# Patient Record
Sex: MALE | Race: WHITE | NOT HISPANIC OR LATINO | Employment: FULL TIME | URBAN - METROPOLITAN AREA
[De-identification: names, ages, dates, MRNs, and addresses within clinical notes are randomized per-mention and may not be internally consistent; named-entity substitution may affect disease eponyms.]

---

## 2022-09-30 ENCOUNTER — CLINICAL SUPPORT (OUTPATIENT)
Dept: INTERNAL MEDICINE | Facility: CLINIC | Age: 57
End: 2022-09-30

## 2022-09-30 VITALS — DIASTOLIC BLOOD PRESSURE: 85 MMHG | SYSTOLIC BLOOD PRESSURE: 143 MMHG | HEART RATE: 66 BPM

## 2022-09-30 DIAGNOSIS — Z00.00 GENERAL MEDICAL EXAM: Primary | ICD-10-CM

## 2022-09-30 LAB
ALBUMIN SERPL-MCNC: 4.6 GM/DL (ref 3.5–5)
ALBUMIN/GLOB SERPL: 1.8 RATIO (ref 1.1–2)
ALP SERPL-CCNC: 72 UNIT/L (ref 40–150)
ALT SERPL-CCNC: 25 UNIT/L (ref 0–55)
AST SERPL-CCNC: 30 UNIT/L (ref 5–34)
BASOPHILS # BLD AUTO: 0.03 X10(3)/MCL (ref 0–0.2)
BASOPHILS NFR BLD AUTO: 0.3 %
BILIRUBIN DIRECT+TOT PNL SERPL-MCNC: 0.8 MG/DL
BUN SERPL-MCNC: 23.5 MG/DL (ref 8.4–25.7)
CALCIUM SERPL-MCNC: 9.5 MG/DL (ref 8.4–10.2)
CHLORIDE SERPL-SCNC: 103 MMOL/L (ref 98–107)
CO2 SERPL-SCNC: 25 MMOL/L (ref 22–29)
CREAT SERPL-MCNC: 1.34 MG/DL (ref 0.73–1.18)
EOSINOPHIL # BLD AUTO: 0.1 X10(3)/MCL (ref 0–0.9)
EOSINOPHIL NFR BLD AUTO: 1.1 %
ERYTHROCYTE [DISTWIDTH] IN BLOOD BY AUTOMATED COUNT: 13.2 % (ref 11.5–17)
EST. AVERAGE GLUCOSE BLD GHB EST-MCNC: 96.8 MG/DL
GFR SERPLBLD CREATININE-BSD FMLA CKD-EPI: >60 MLS/MIN/1.73/M2
GLOBULIN SER-MCNC: 2.6 GM/DL (ref 2.4–3.5)
GLUCOSE SERPL-MCNC: 86 MG/DL (ref 74–100)
GLUCOSE SERPL-MCNC: 89 MG/DL (ref 70–110)
HBA1C MFR BLD: 5 %
HCT VFR BLD AUTO: 39.9 % (ref 42–52)
HGB BLD-MCNC: 12.9 GM/DL (ref 14–18)
IMM GRANULOCYTES # BLD AUTO: 0.03 X10(3)/MCL (ref 0–0.04)
IMM GRANULOCYTES NFR BLD AUTO: 0.3 %
LYMPHOCYTES # BLD AUTO: 1.74 X10(3)/MCL (ref 0.6–4.6)
LYMPHOCYTES NFR BLD AUTO: 19.6 %
MCH RBC QN AUTO: 30 PG (ref 27–31)
MCHC RBC AUTO-ENTMCNC: 32.3 MG/DL (ref 33–36)
MCV RBC AUTO: 92.8 FL (ref 80–94)
MONOCYTES # BLD AUTO: 0.7 X10(3)/MCL (ref 0.1–1.3)
MONOCYTES NFR BLD AUTO: 7.9 %
NEUTROPHILS # BLD AUTO: 6.3 X10(3)/MCL (ref 2.1–9.2)
NEUTROPHILS NFR BLD AUTO: 70.8 %
NRBC BLD AUTO-RTO: 0 %
PLATELET # BLD AUTO: 295 X10(3)/MCL (ref 130–400)
PMV BLD AUTO: 11.3 FL (ref 7.4–10.4)
POC CHOLESTEROL, HDL: 46
POC CHOLESTEROL, LDL: 53
POC CHOLESTEROL, TOTAL: 113 MG/DL
POC GLUCOSE FASTING: NORMAL
POC TOTAL CHOLESTEROL / HDL RATIO: 2.4
POC TRIGLYCERIDES: 69
POTASSIUM SERPL-SCNC: 4.3 MMOL/L (ref 3.5–5.1)
PROT SERPL-MCNC: 7.2 GM/DL (ref 6.4–8.3)
PSA SERPL-MCNC: 3 NG/ML
RBC # BLD AUTO: 4.3 X10(6)/MCL (ref 4.7–6.1)
SODIUM SERPL-SCNC: 138 MMOL/L (ref 136–145)
WBC # SPEC AUTO: 8.9 X10(3)/MCL (ref 4.5–11.5)

## 2022-09-30 PROCEDURE — 99499 UNLISTED E&M SERVICE: CPT | Mod: ,,, | Performed by: FAMILY MEDICINE

## 2022-09-30 PROCEDURE — 82947 ASSAY GLUCOSE BLOOD QUANT: CPT | Mod: QW,,, | Performed by: FAMILY MEDICINE

## 2022-09-30 PROCEDURE — 99172 PR VISUAL FUNCT SCREENING, BILAT: ICD-10-PCS | Mod: ,,, | Performed by: FAMILY MEDICINE

## 2022-09-30 PROCEDURE — 82947 PR  ASSAY QUANTITATIVE,BLOOD GLUCOSE: ICD-10-PCS | Mod: QW,,, | Performed by: FAMILY MEDICINE

## 2022-09-30 PROCEDURE — 80061 LIPID PANEL: CPT | Mod: QW,,, | Performed by: FAMILY MEDICINE

## 2022-09-30 PROCEDURE — 85025 COMPLETE CBC W/AUTO DIFF WBC: CPT | Performed by: FAMILY MEDICINE

## 2022-09-30 PROCEDURE — 0358T PR BIOELECTRICAL IMPEDANCE WHOLE BODY COMPOSITION ASSESSMENT W/I&R: ICD-10-PCS | Mod: ,,, | Performed by: FAMILY MEDICINE

## 2022-09-30 PROCEDURE — 80061 PR  LIPID PANEL: ICD-10-PCS | Mod: QW,,, | Performed by: FAMILY MEDICINE

## 2022-09-30 PROCEDURE — 0358T BIA WHOLE BODY: CPT | Mod: ,,, | Performed by: FAMILY MEDICINE

## 2022-09-30 PROCEDURE — 84153 ASSAY OF PSA TOTAL: CPT | Performed by: FAMILY MEDICINE

## 2022-09-30 PROCEDURE — 99499 PR EXECUTIVE FIRE DEPARTMENT LGMD: ICD-10-PCS | Mod: ,,, | Performed by: FAMILY MEDICINE

## 2022-09-30 PROCEDURE — 36415 PR COLLECTION VENOUS BLOOD,VENIPUNCTURE: ICD-10-PCS | Mod: ,,, | Performed by: FAMILY MEDICINE

## 2022-09-30 PROCEDURE — 83036 HEMOGLOBIN GLYCOSYLATED A1C: CPT | Performed by: FAMILY MEDICINE

## 2022-09-30 PROCEDURE — 36415 COLL VENOUS BLD VENIPUNCTURE: CPT | Mod: ,,, | Performed by: FAMILY MEDICINE

## 2022-09-30 PROCEDURE — 99172 OCULAR FUNCTION SCREEN: CPT | Mod: ,,, | Performed by: FAMILY MEDICINE

## 2022-09-30 PROCEDURE — 80053 COMPREHEN METABOLIC PANEL: CPT | Performed by: FAMILY MEDICINE

## 2022-09-30 NOTE — PROGRESS NOTES
A one-time consultation was provided to this patient today. The following information was reviewed in detail with them:  -Health Risk Assessment (HRA)  -Vital Signs  -POC lipid panel  -InBody Assessment  -Diet, exercise, and general health recommendations    Recommendations were made to the patient, and they were encouraged to either follow up with their current PCP or establish with a PCP for follow up of any chronic problems.    Jacoby Blue is a 57 y.o. male.  Based on the patient's HRA, Lipid Panel, Vitals, and InBody Assessment, the following recommendations were made:  5lb fat mass loss while increasing skeletal muscle mass.   Monitor blood pressure. Goal is <140/90. See your doctor if remains elevated.        Cancer Screening: All recommended cancer screening exams are up to date which include:  colorectal cancer screening and prostate cancer screening         Exercise Prescription         Frequency: 3-5 sessions/week         Intensity: Moderate (sweating after 10 min, can carry a conversation but short winded)         Type: Aerobic: walking, stationary cycling, aquatic exercise, running          Time: 30 minutes (150 minutes/week)           Plus         Frequency: 2-3 session/week         Type: Strength/Resistance Training: exercises using resistance bands, weight machines, handheld  weights or body weight            Plus         Frequency: 5-7 sessions/week         Type: Flexibility training: stretching, yoga, paola chi         Time: 5-10 minutes    The following results were pending at the conclusion of the visit and will be followed up. The patient will be contacted with results:  Hgb A1c  PSA  CBC  CMP      Patient instructed to contact our office with any further questions or concerns  Debbie Mendoza MD

## 2022-10-03 NOTE — PROGRESS NOTES
Please let the patient know that all labs have been reviewed.     HbA1c, l and PSA are normal.   Creatinine level is mildly elevated but kidney function is normal. Stay hydrated, and monitor blood pressure as discussed during our visit.   Blood counts are a little low. Recommend f/u with your PCP for further evaluation and treatment.     POCT lipid panel discussed at time of visit.   Repeat screening in 1 year.

## 2023-02-08 ENCOUNTER — TELEPHONE (OUTPATIENT)
Dept: HEPATOLOGY | Facility: CLINIC | Age: 58
End: 2023-02-08
Payer: COMMERCIAL

## 2023-02-08 NOTE — TELEPHONE ENCOUNTER
----- Message from Sima Gray sent at 2023 12:48 PM CST -----  Regardin  Contact: Gloria Farrell  ( nurse )  from [facility] from is calling to speak to staff  in regards for one time visit and  evaluation for exam of possible prostate cancer for pt.  Prepayment will be in hand before appt.  Please Advise.   Request a call back                [phone] ( [phone])

## 2023-02-08 NOTE — TELEPHONE ENCOUNTER
Advised caller Dr. Tam is hepatology and not oncology. Caller is looking for a physician to review a pt's case and do an exam. DIONNA ENG

## 2023-03-23 DIAGNOSIS — Z00.00 GENERAL MEDICAL EXAM: Primary | ICD-10-CM

## 2023-04-03 ENCOUNTER — CLINICAL SUPPORT (OUTPATIENT)
Dept: INTERNAL MEDICINE | Facility: CLINIC | Age: 58
End: 2023-04-03
Payer: COMMERCIAL

## 2023-04-03 VITALS
BODY MASS INDEX: 26.89 KG/M2 | WEIGHT: 171.31 LBS | SYSTOLIC BLOOD PRESSURE: 143 MMHG | HEART RATE: 76 BPM | DIASTOLIC BLOOD PRESSURE: 93 MMHG | HEIGHT: 67 IN

## 2023-04-03 DIAGNOSIS — Z00.00 GENERAL MEDICAL EXAM: ICD-10-CM

## 2023-04-03 LAB
ALBUMIN SERPL-MCNC: 4.2 G/DL (ref 3.5–5)
ALBUMIN/GLOB SERPL: 1.3 RATIO (ref 1.1–2)
ALP SERPL-CCNC: 80 UNIT/L (ref 40–150)
ALT SERPL-CCNC: 185 UNIT/L (ref 0–55)
AST SERPL-CCNC: 80 UNIT/L (ref 5–34)
BASOPHILS # BLD AUTO: 0.01 X10(3)/MCL (ref 0–0.2)
BASOPHILS NFR BLD AUTO: 0.2 %
BILIRUBIN DIRECT+TOT PNL SERPL-MCNC: 0.5 MG/DL
BUN SERPL-MCNC: 19.6 MG/DL (ref 8.4–25.7)
CALCIUM SERPL-MCNC: 9.7 MG/DL (ref 8.4–10.2)
CHLORIDE SERPL-SCNC: 104 MMOL/L (ref 98–107)
CO2 SERPL-SCNC: 26 MMOL/L (ref 22–29)
CREAT SERPL-MCNC: 1.17 MG/DL (ref 0.73–1.18)
EOSINOPHIL # BLD AUTO: 0.09 X10(3)/MCL (ref 0–0.9)
EOSINOPHIL NFR BLD AUTO: 2.1 %
ERYTHROCYTE [DISTWIDTH] IN BLOOD BY AUTOMATED COUNT: 13.9 % (ref 11.5–17)
EST. AVERAGE GLUCOSE BLD GHB EST-MCNC: 111.2 MG/DL
GFR SERPLBLD CREATININE-BSD FMLA CKD-EPI: >60 MLS/MIN/1.73/M2
GLOBULIN SER-MCNC: 3.2 GM/DL (ref 2.4–3.5)
GLUCOSE SERPL-MCNC: 110 MG/DL (ref 74–100)
GLUCOSE SERPL-MCNC: NORMAL MG/DL (ref 70–110)
HBA1C MFR BLD: 5.5 %
HCT VFR BLD AUTO: 39.8 % (ref 42–52)
HGB BLD-MCNC: 12.8 G/DL (ref 14–18)
IMM GRANULOCYTES # BLD AUTO: 0.01 X10(3)/MCL (ref 0–0.04)
IMM GRANULOCYTES NFR BLD AUTO: 0.2 %
LYMPHOCYTES # BLD AUTO: 0.37 X10(3)/MCL (ref 0.6–4.6)
LYMPHOCYTES NFR BLD AUTO: 8.5 %
MCH RBC QN AUTO: 30 PG (ref 27–31)
MCHC RBC AUTO-ENTMCNC: 32.2 G/DL (ref 33–36)
MCV RBC AUTO: 93.4 FL (ref 80–94)
MONOCYTES # BLD AUTO: 0.53 X10(3)/MCL (ref 0.1–1.3)
MONOCYTES NFR BLD AUTO: 12.2 %
NEUTROPHILS # BLD AUTO: 3.34 X10(3)/MCL (ref 2.1–9.2)
NEUTROPHILS NFR BLD AUTO: 76.8 %
NRBC BLD AUTO-RTO: 0 %
PLATELET # BLD AUTO: 257 X10(3)/MCL (ref 130–400)
PMV BLD AUTO: 11 FL (ref 7.4–10.4)
POC CHOLESTEROL, HDL: 58
POC CHOLESTEROL, LDL: 77
POC CHOLESTEROL, TOTAL: 149 MG/DL
POC GLUCOSE FASTING: 109
POC TOTAL CHOLESTEROL / HDL RATIO: NORMAL
POC TRIGLYCERIDES: 68
POTASSIUM SERPL-SCNC: 4.4 MMOL/L (ref 3.5–5.1)
PROT SERPL-MCNC: 7.4 GM/DL (ref 6.4–8.3)
PSA SERPL-MCNC: 0.94 NG/ML
RBC # BLD AUTO: 4.26 X10(6)/MCL (ref 4.7–6.1)
SODIUM SERPL-SCNC: 138 MMOL/L (ref 136–145)
WBC # SPEC AUTO: 4.4 X10(3)/MCL (ref 4.5–11.5)

## 2023-04-03 PROCEDURE — 0358T BIA WHOLE BODY: CPT | Mod: ,,, | Performed by: FAMILY MEDICINE

## 2023-04-03 PROCEDURE — 84153 ASSAY OF PSA TOTAL: CPT

## 2023-04-03 PROCEDURE — 83036 HEMOGLOBIN GLYCOSYLATED A1C: CPT

## 2023-04-03 PROCEDURE — 82947 POCT LIPID PROFILE WITH GLUCOSE FINGERSTICK: ICD-10-PCS | Mod: QW,,, | Performed by: FAMILY MEDICINE

## 2023-04-03 PROCEDURE — 99172 OCULAR FUNCTION SCREEN: CPT | Mod: ,,, | Performed by: FAMILY MEDICINE

## 2023-04-03 PROCEDURE — 99172 PR VISUAL FUNCT SCREENING, BILAT: ICD-10-PCS | Mod: ,,, | Performed by: FAMILY MEDICINE

## 2023-04-03 PROCEDURE — 0358T PR BIOELECTRICAL IMPEDANCE WHOLE BODY COMPOSITION ASSESSMENT W/I&R: ICD-10-PCS | Mod: ,,, | Performed by: FAMILY MEDICINE

## 2023-04-03 PROCEDURE — 80053 COMPREHEN METABOLIC PANEL: CPT

## 2023-04-03 PROCEDURE — 36415 COLL VENOUS BLD VENIPUNCTURE: CPT | Mod: ,,, | Performed by: FAMILY MEDICINE

## 2023-04-03 PROCEDURE — 36415 PR COLLECTION VENOUS BLOOD,VENIPUNCTURE: ICD-10-PCS | Mod: ,,, | Performed by: FAMILY MEDICINE

## 2023-04-03 PROCEDURE — 99499 PR EXECUTIVE FIRE DEPARTMENT LGMD: ICD-10-PCS | Mod: ,,, | Performed by: FAMILY MEDICINE

## 2023-04-03 PROCEDURE — 80061 LIPID PANEL: CPT | Mod: QW,,, | Performed by: FAMILY MEDICINE

## 2023-04-03 PROCEDURE — 85025 COMPLETE CBC W/AUTO DIFF WBC: CPT

## 2023-04-03 PROCEDURE — 80061 POCT LIPID PROFILE WITH GLUCOSE FINGERSTICK: ICD-10-PCS | Mod: QW,,, | Performed by: FAMILY MEDICINE

## 2023-04-03 PROCEDURE — 82947 ASSAY GLUCOSE BLOOD QUANT: CPT | Mod: QW,,, | Performed by: FAMILY MEDICINE

## 2023-04-03 PROCEDURE — 99499 UNLISTED E&M SERVICE: CPT | Mod: ,,, | Performed by: FAMILY MEDICINE

## 2023-04-03 NOTE — PROGRESS NOTES
"A one-time consultation was provided to this patient today. The following information was reviewed in detail with them:  -Health Risk Assessment (HRA)  -Vital Signs  -POC lipid panel  -InBody Assessment  -Diet, exercise, and general health recommendations    Recommendations were made to the patient, and they were encouraged to either follow up with their current PCP or establish with a PCP for follow up of any chronic problems.    Jacoby Blue is a 57 y.o. male.  Since our visit last year, Mr. Blue was diagnosed (Oct 2022)and has been treated for prostate cancer by Dr. Berman. He is currently undergoing radiation and is half way through this treatment course. He is also taking testosterone blockers. He reports that he feels fatigued and has noticed decreased muscle mass. He is experiencing hot flashes and mood swings. He feels sad at times but not depressed.   His home blood pressure readings are 120s/70s. He feels fine today.     Based on the patient's HRA, Lipid Panel, Vitals, and InBody Assessment, the following recommendations were made:  Continue your current treatment plan and care with your physicians.   Follow a heart healthy diet and exercise regularly.     His PSA is expected to be elevated due to his current radiation treatment. He is aware of this and expecting a high value.         Cancer Screening: Not all recommended cancer screening exams are up to date. These include: colorectal cancer screening       Immunizations:  Not all recommended immunizations are up to date. These include: Shingles vaccine    BP (!) 143/93   Pulse 76   Ht 5' 7" (1.702 m)   Wt 77.7 kg (171 lb 4.8 oz)   BMI 26.83 kg/m²      Lab Results   Component Value Date    CHOL 149 04/03/2023    POCLDL 77 04/03/2023    POCHDL 58 04/03/2023    POCTCHDL 2.4 09/30/2022    POCTRIG 68 04/03/2023    GLUFAST 109 04/03/2023    POCGLU 89 09/30/2022         Exercise Prescription         Frequency: 3-5 sessions/week         Intensity: Moderate " (sweating after 10 min, can carry a conversation but short winded)         Type: Aerobic: walking, stationary cycling, aquatic exercise, running          Time: 30 minutes (150 minutes/week)           Plus         Frequency: 2-3 session/week         Type: Strength/Resistance Training: exercises using resistance bands, weight machines, handheld  weights or body weight            Plus         Frequency: 5-7 sessions/week         Type: Flexibility training: stretching, yoga, paola chi         Time: 5-10 minutes    Diet recommendations: heart healthy diet such as the Mediterranean-style diet.         -Eat an overall healthy dietary pattern that emphasizes:   -a wide variety of fruits and vegetables   -whole grains and products made up mostly of whole grains   -healthy sources of protein (mostly plants such as legumes and nuts; fish and seafood; low fat or nonfat dairy; and , if you eat meat and poultry, ensuring it is lean and unprocessed)   -liquid non-tropical vegetable oils   -minimally processed foods   -minimized intake of added sugars   -food prepared with little or no salt   -limited or preferably no alcohol intake    The following results were pending at the conclusion of the visit and will be followed up. The patient will be contacted with results:  Hgb A1c  PSA  CBC  CMP      Patient instructed to contact our office with any further questions or concerns  Debbie Mendoza MD

## 2023-04-04 NOTE — PROGRESS NOTES
Please let the patient know that all labs have been reviewed.  His sugar test is elevated but his diabetes screening test is normal at this time. However, it has increased in the last 6 months. He should monitor his sugar intake, especially his late afternoon/evening sugar intake and cut back by at least half.     His liver enzymes are elevated (Alanine Amnninotransferase and Aspartate Aminotransferase). It is recommended that he communicate this to his Radiation oncology team soon. I'm unsure if this is a recent increase or not. His oncology team would be able to tell him how worrisome this is. But he should follow up with them soon.     His blood counts (Hgb and Hct) are low but this is mostly unchanged from 6 months ago. His WBCs are low and could be due to his recent cancer treatment. He should also report this to his oncology team to ensure they are ok with this level.     His PSA is not elevated as expected and I will defer to his oncology team on the significance of this in light of his current radiation treatment.       CBC, HbA1c, chemistry panel and PSA reviewed.   POCT lipid panel discussed at time of visit.   Repeat screening in 1 year.

## 2023-04-05 ENCOUNTER — TELEPHONE (OUTPATIENT)
Dept: INTERNAL MEDICINE | Facility: CLINIC | Age: 58
End: 2023-04-05
Payer: COMMERCIAL

## 2023-04-05 NOTE — TELEPHONE ENCOUNTER
Below discussed with patient.  Verbalizes understanding.  No further questions voiced. Request for Lab results received from Dr Erica Vasquez.  Results faxed.  Patient notified.     Mitra Ramsay RN    ----- Message from Debbie Mendoza MD sent at 4/4/2023  7:42 AM CDT -----  Please let the patient know that all labs have been reviewed.  His sugar test is elevated but his diabetes screening test is normal at this time. However, it has increased in the last 6 months. He should monitor his sugar intake, especially his late afternoon/evening sugar intake and cut back by at least half.     His liver enzymes are elevated (Alanine Amnninotransferase and Aspartate Aminotransferase). It is recommended that he communicate this to his Radiation oncology team soon. I'm unsure if this is a recent increase or not. His oncology team would be able to tell him how worrisome this is. But he should follow up with them soon.     His blood counts (Hgb and Hct) are low but this is mostly unchanged from 6 months ago. His WBCs are low and could be due to his recent cancer treatment. He should also report this to his oncology team to ensure they are ok with this level.     His PSA is not elevated as expected and I will defer to his oncology team on the significance of this in light of his current radiation treatment.       CBC, HbA1c, chemistry panel and PSA reviewed.   POCT lipid panel discussed at time of visit.   Repeat screening in 1 year.

## 2023-04-14 ENCOUNTER — TELEPHONE (OUTPATIENT)
Dept: HEMATOLOGY/ONCOLOGY | Facility: CLINIC | Age: 58
End: 2023-04-14
Payer: COMMERCIAL

## 2023-04-14 NOTE — TELEPHONE ENCOUNTER
Returned call to Gloria regarding request for workers compensation second opinion. Original fax saved to media. Oncology nurse navigator escalated to management and awaiting response. Informed Gloria that she will receive a response as soon as available. Provided nurse navigator direct contact information.

## 2023-04-21 ENCOUNTER — TELEPHONE (OUTPATIENT)
Dept: HEMATOLOGY/ONCOLOGY | Facility: CLINIC | Age: 58
End: 2023-04-21
Payer: COMMERCIAL

## 2023-04-21 ENCOUNTER — PATIENT MESSAGE (OUTPATIENT)
Dept: HEMATOLOGY/ONCOLOGY | Facility: CLINIC | Age: 58
End: 2023-04-21
Payer: COMMERCIAL

## 2023-04-21 NOTE — TELEPHONE ENCOUNTER
Oncology nurse navigator contacted the patient to discuss the consult from [facility]. Patient requires a second opinion for prostate cancer. Nurse navigator was initially contacted by Medical Case manager Gloria Farrell with inquiry for consultation. Appropriate medical records will be provided now that appointment is scheduled. Patient agreed to visit at 11am on 5/23. Nurse contact information sent via [facility] portal. Patient verbalized understanding of appointment details. Instructed to call for any additional questions/concerns. Nurse navigator will continue to communicate with  to ensure completion of consultation.

## 2023-04-21 NOTE — TELEPHONE ENCOUNTER
Patient requests 2pm appointment on 5/23 rather than 11am. Appointment rescheduled as requested.

## 2023-05-16 ENCOUNTER — DOCUMENTATION ONLY (OUTPATIENT)
Dept: HEMATOLOGY/ONCOLOGY | Facility: CLINIC | Age: 58
End: 2023-05-16
Payer: COMMERCIAL

## 2023-05-16 DIAGNOSIS — C61 PROSTATE CA: Primary | ICD-10-CM

## 2023-05-23 ENCOUNTER — OFFICE VISIT (OUTPATIENT)
Dept: HEMATOLOGY/ONCOLOGY | Facility: CLINIC | Age: 58
End: 2023-05-23

## 2023-05-23 VITALS
BODY MASS INDEX: 27.89 KG/M2 | WEIGHT: 177.69 LBS | TEMPERATURE: 97 F | HEIGHT: 67 IN | HEART RATE: 83 BPM | RESPIRATION RATE: 17 BRPM | DIASTOLIC BLOOD PRESSURE: 81 MMHG | OXYGEN SATURATION: 99 % | SYSTOLIC BLOOD PRESSURE: 138 MMHG

## 2023-05-23 DIAGNOSIS — C61 PROSTATE CANCER: ICD-10-CM

## 2023-05-23 DIAGNOSIS — M45.9 RHEUMATOID ARTHRITIS INVOLVING VERTEBRA, UNSPECIFIED WHETHER RHEUMATOID FACTOR PRESENT: ICD-10-CM

## 2023-05-23 PROBLEM — M06.9 RHEUMATOID ARTHRITIS: Status: ACTIVE | Noted: 2023-05-23

## 2023-05-23 PROCEDURE — 99999 PR PBB SHADOW E&M-EST. PATIENT-LVL III: CPT | Mod: PBBFAC,,, | Performed by: INTERNAL MEDICINE

## 2023-05-23 PROCEDURE — 99205 OFFICE O/P NEW HI 60 MIN: CPT | Mod: S$GLB,,, | Performed by: INTERNAL MEDICINE

## 2023-05-23 PROCEDURE — 99205 PR OFFICE/OUTPT VISIT, NEW, LEVL V, 60-74 MIN: ICD-10-PCS | Mod: S$GLB,,, | Performed by: INTERNAL MEDICINE

## 2023-05-23 PROCEDURE — 99999 PR PBB SHADOW E&M-EST. PATIENT-LVL III: ICD-10-PCS | Mod: PBBFAC,,, | Performed by: INTERNAL MEDICINE

## 2023-05-23 RX ORDER — MELOXICAM 15 MG/1
15 TABLET ORAL DAILY
COMMUNITY

## 2023-05-23 NOTE — PROGRESS NOTES
Subjective     Patient ID: Jacoby Blue is a 57 y.o. male.    Chief Complaint: Prostate Cancer    HPI    Presents per [facility] Workers Comp request to review as a 2nd opinion  [place] Revised Statutes Tit. 33, § 2011. 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 The disease or infirmity shall be presumed to have been caused by or to have resulted from the work performed. This presumption shall be rebuttable by evidence meeting judicial standards, and shall be extended to a member following termination of service for a period of three months for each full year of service not to exceed sixty months commencing with the last actual date of service.  B.The cancer referred to in Subsection A of this Section shall be limited to the types of cancer which may be caused by exposure to heat, smoke, radiation, or a known or suspected carcinogen as defined by the International Agency for Research on Cancer. The cancer shall also be limited to a cancer originating in the bladder, brain, colon, liver, pancreas, skin, kidney, or gastrointestinal or reproductive tract, and leukemia, lymphoma, multiple myeloma, prostate cancer, and testicular cancer, or any other type of cancer, due to occupational exposure, for which firefighters are determined to have a statistically significant increased risk over that of the general population.  C.The affected employee or his survivors shall be entitled to all rights and benefits as granted by state law to which one suffering an occupational injury is entitled as service  connected in the line of duty.  D.The provisions of this Section shall not be construed to affect in any way the provisions of R[place]:2581.    Presents for a Medical Oncology opinion  Of note- he sees Radiation Oncology locally- Dr. Berman who recommended 2 years of ADT  He sees Urology locally- Dr. Chandler- urologist who is monitoring 3 months at a time    Oncology History:  - 5/17/2023 PSA= 0.24 ng/ml (most recent PSA) post 1 dose Eligard    He reports side effects as anticipated on ADT  + hot flashes- particularly at night and impacts the most at night  + fatigue- 3 month shot wearing off  Notes some muscular strength returning as ADT wears off    He also is concerned about elevated liver function tests while on ADT (noted to be mild transaminitis)  States he has been on Sulfasalazine x 4-5 years - never had abnormal lfts on prior wellness checks on this medication  Recently lfts elevated- Rheumatologist and PCP did additional bloodwork  Lfts trending down after holding sulfasalazine but also notes eligard was wearing off when rechecked  He questions whether related to the eligard    Prior Urology evaluation  - 10/6/2009 prior urology assessment for 1 yr urinary frequency at [facility]  Prostate noted on exam to be 20- 30 gm, rt aide diffusely larger than lt, boggy and tender  Impression: prostatitis and treated with Cipro with 1 month follow up rec for cystoscopy    - 11/11/2009 Cystoscope- Negative     - 1/21/2015 referred back for microscopic hematuria  Plan: scans (see below) and cystoscopy    - 1/23/2015 CT Abd/Pelvis:  Right renal superior pole mass consistent with complex cyst (followed up 7/13/15, 2/23/16 with CT abd- stable)  Left liver lobe subcm low density lesions c/w cysts  Diverticulosis  Enlarged seminal vesicle w/out evidence of discreet mass  RLL localized reticular nodular masses, very small LLL nodules- 3 mt follow up recommended (7/13/15 CT stable)    - 2/2/2015 Cystoscope negative  PSA=  0.94 ng/ml    - followed regularly annually - some missed years noted with following PSA's documented (9/27/2021 LORENA= 1.93 ng/ml; 6/24/2022 PSA  2.40 ng/ml; 4/19/2019 1.66 ng/ml; 3/7/2017 1.430 ng/ml; 2/8/2016 1.0 ng/ml; 2/3/2015 0.94 ng/ml)  ----------------------------------------------------------------------------------------------------------------------------------------------------------------------------------------------------------------------------------------------------------------------------------------------------------------------------------  Oncology History:  Then in 2022 noted to have an elevated PSA    - 10/3/2022 PSA= 3.01 ng/ml, firmness rt lobe on exam    - U/S guided biopsy:  Pathology: [facility] 7 in 6 areas (90% and 80% of specimens)  Decipher= 0.98 high risk    - 11/7/2022 MRI Prostate w/wo contrast:  Prostate 4.3 x 3 x 4.6 cm (31 g)  Peripheral zone: 1 cm aggressive lesion left base  Central gland: multiple nodules of varying signal c/w BPH  Extraprostatic assessment: no irregular bulge of prostate capsule  Bladder, rectum, pelvic sidewall free of tumor invasion  Seminal vesicles no involvement  Pelvic LNs: 8 mm short axis left iliac LN present suspicious    - 1/4/2023 PSMA PET:  No evidence for metastatic disease  - given Eligard 1/27/2023 x 1 (3 month injection)  Has held off on next shot as PSA controlled    - Genetics completed  Ambry- negative    - underwent XRT from 1/26/2023- through 4/25/2023  Reports overall tolerated fairly well  He did have diarrhea and some bloody stools that resolved- placido have a GI consult pending regardless (prior negative colonoscopy)    - 1/27/2023 received 3 month Eligard     SH:   x 20 years (exposures here)  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  Lives with his wife  2 daughters - healthy  Prior chewing tobacco- quit off and on over  the years- Quit 3/2022  Quit EtOH  (states daily prior when he was off)    FH:  Dad  of metastatic lung cancer at 67 (smoker)  Mom- living at approx 80, cardiac issues (prior tobacco), prior benign brain tumors  1 brother- emphysema (smoker), gastric bypass  1 sister- kidney cancer, living at age 60; thought to also have benign brain tumor (unable to bx based on location), emphysema (smoker)    Review of Systems   Constitutional:  Negative for activity change, appetite change, fatigue, fever and unexpected weight change.   HENT:  Negative for nasal congestion.    Respiratory:  Negative for cough, shortness of breath and wheezing.    Cardiovascular:  Negative for chest pain, palpitations and leg swelling.   Gastrointestinal:  Negative for abdominal distention, abdominal pain, change in bowel habit, constipation, diarrhea, nausea and change in bowel habit.   Genitourinary:  Positive for frequency (on Flomax which helps). Negative for bladder incontinence, decreased urine volume, difficulty urinating, dysuria and urgency.   Musculoskeletal:  Positive for arthralgias. Negative for gait problem and myalgias.        Noted loss of muscle mass, regaining   Integumentary:  Negative for rash and mole/lesion.   Neurological:  Negative for weakness (improving, was generalized) and numbness.   Psychiatric/Behavioral:  Negative for dysphoric mood. The patient is not nervous/anxious.         Objective     Physical Exam  Vitals and nursing note reviewed.   Constitutional:       General: He is not in acute distress.     Appearance: Normal appearance. He is normal weight. He is not ill-appearing.      Comments: Presents alone  Very pleasant  ECOG= 0   HENT:      Head: Normocephalic and atraumatic.   Eyes:      Extraocular Movements: Extraocular movements intact.      Conjunctiva/sclera: Conjunctivae normal.      Pupils: Pupils are equal, round, and reactive to light.   Cardiovascular:      Rate and Rhythm: Normal rate and  regular rhythm.      Heart sounds: Normal heart sounds. No murmur heard.    No friction rub. No gallop.   Pulmonary:      Effort: Pulmonary effort is normal. No respiratory distress.      Breath sounds: Normal breath sounds. No wheezing, rhonchi or rales.   Abdominal:      General: Abdomen is flat. Bowel sounds are normal. There is no distension.      Palpations: Abdomen is soft. There is no mass.      Tenderness: There is no abdominal tenderness. There is no guarding or rebound.      Comments: No organomegaly   Musculoskeletal:         General: No swelling or tenderness. Normal range of motion.      Right lower leg: No edema.      Left lower leg: No edema.   Skin:     General: Skin is warm and dry.      Coloration: Skin is not jaundiced or pale.      Findings: No lesion or rash.   Neurological:      General: No focal deficit present.      Mental Status: He is alert and oriented to person, place, and time.      Motor: No weakness.      Coordination: Coordination normal.      Gait: Gait normal.   Psychiatric:         Mood and Affect: Mood normal.         Behavior: Behavior normal.         Thought Content: Thought content normal.         Judgment: Judgment normal.     Labs- reviewed  Outside - reviewed     Assessment and Plan     Problem List Items Addressed This Visit       Rheumatoid arthritis    Prostate cancer       Rheumatoid arthritis- sees Rheumatology and managed on therapy    Prostate cancer  High risk per Decipher  He is post XRT and Eligard 3 month injection x 1  He is opting for intermittent as PSA responding and he has side effects but will need close monitoring    All questions answered    Supporting studies for increased prostate cancer risk  Links  below:  https://www.ncbi.nlm.nih.gov/pmc/articles/DEQ0258196/  https://www.cdc.gov/niosh/pgms/worknotify/pdfs/ff-cancer-factsheet-final-508.pdf  https://www.[employer].org.au/news-media/news/male-firefighters-have-vd-lntqesqcz-ycif-of-prostate-cancer/#:~:text=These%20men%20had%20an%20increased,the%20increase%20in%20prostate%20cancer%3F    > 1 hour total in chart review  > 1 hour total direct patient careRoute Chart for Scheduling    Med Onc Chart Routing      Follow up with physician 6 months.   Follow up with HATTIE    Infusion scheduling note    Injection scheduling note    Labs    Imaging    Pharmacy appointment    Other referrals

## 2024-01-01 ENCOUNTER — ON-DEMAND VIRTUAL (OUTPATIENT)
Dept: URGENT CARE | Facility: CLINIC | Age: 59
End: 2024-01-01
Payer: COMMERCIAL

## 2024-01-01 DIAGNOSIS — R05.9 COUGH, UNSPECIFIED TYPE: ICD-10-CM

## 2024-01-01 DIAGNOSIS — U07.1 COVID-19: Primary | ICD-10-CM

## 2024-01-01 PROCEDURE — 99213 OFFICE O/P EST LOW 20 MIN: CPT | Mod: 95,,, | Performed by: NURSE PRACTITIONER

## 2024-01-01 RX ORDER — METOPROLOL SUCCINATE 50 MG/1
50 TABLET, EXTENDED RELEASE ORAL DAILY
COMMUNITY
Start: 2023-11-22

## 2024-01-01 RX ORDER — NIRMATRELVIR AND RITONAVIR 300-100 MG
KIT ORAL
Qty: 30 TABLET | Refills: 0 | Status: SHIPPED | OUTPATIENT
Start: 2024-01-01 | End: 2024-01-06

## 2024-01-01 RX ORDER — ASPIRIN 81 MG/1
1 TABLET ORAL EVERY MORNING
COMMUNITY
Start: 2023-10-16

## 2024-01-01 RX ORDER — AMLODIPINE BESYLATE 10 MG/1
10 TABLET ORAL
COMMUNITY
Start: 2023-12-07

## 2024-01-01 RX ORDER — LOSARTAN POTASSIUM AND HYDROCHLOROTHIAZIDE 12.5; 5 MG/1; MG/1
2 TABLET ORAL EVERY MORNING
COMMUNITY

## 2024-01-01 RX ORDER — BENZONATATE 100 MG/1
100 CAPSULE ORAL 3 TIMES DAILY PRN
Qty: 30 CAPSULE | Refills: 0 | Status: SHIPPED | OUTPATIENT
Start: 2024-01-01 | End: 2024-01-11

## 2024-01-01 RX ORDER — CLONIDINE HYDROCHLORIDE 0.1 MG/1
0.1 TABLET ORAL 2 TIMES DAILY PRN
COMMUNITY
Start: 2023-08-16

## 2024-01-01 RX ORDER — SULFASALAZINE 500 MG/1
1000 TABLET ORAL 2 TIMES DAILY
COMMUNITY
Start: 2023-12-15

## 2024-01-01 RX ORDER — TAMSULOSIN HYDROCHLORIDE 0.4 MG/1
1 CAPSULE ORAL 2 TIMES DAILY
COMMUNITY
Start: 2023-12-21

## 2024-01-01 NOTE — PROGRESS NOTES
Subjective:      Patient ID: Jacoby Blue is a 58 y.o. male.    Vitals:  vitals were not taken for this visit.     Chief Complaint: URI      Visit Type: TELE AUDIOVISUAL    Present with the patient at the time of consultation: TELEMED PRESENT WITH PATIENT: None    History reviewed. No pertinent past medical history.  History reviewed. No pertinent surgical history.  Review of patient's allergies indicates:  No Known Allergies  Current Outpatient Medications on File Prior to Visit   Medication Sig Dispense Refill    amLODIPine (NORVASC) 10 MG tablet Take 10 mg by mouth.      aspirin (ECOTRIN) 81 MG EC tablet Take 1 tablet by mouth every morning.      cloNIDine (CATAPRES) 0.1 MG tablet Take 0.1 mg by mouth 2 (two) times daily as needed.      metoprolol succinate (TOPROL-XL) 50 MG 24 hr tablet Take 50 mg by mouth once daily.      sulfaSALAzine (AZULFIDINE) 500 mg Tab Take 1,000 mg by mouth 2 (two) times daily.      tamsulosin (FLOMAX) 0.4 mg Cap Take 1 capsule by mouth 2 (two) times daily.      losartan-hydrochlorothiazide 50-12.5 mg (HYZAAR) 50-12.5 mg per tablet Take 2 tablets by mouth every morning.      meloxicam (MOBIC) 15 MG tablet Take 15 mg by mouth once daily.       No current facility-administered medications on file prior to visit.     History reviewed. No pertinent family history.    Medications Ordered                [facility]  Pharmacy [address]   [address] [facility] [address]    Telephone: [phone]   Fax: [phone]   Hours: Not open 24 hours                         E-Prescribed (2 of 2)              benzonatate (TESSALON) 100 MG capsule    Sig: Take 1 capsule (100 mg total) by mouth 3 (three) times daily as needed for Cough.       Start: 1/1/24     Quantity: 30 capsule Refills: 0                         nirmatrelvir-ritonavir (PAXLOVID) 300 mg (150 mg x 2)-100 mg copackaged tablets (EUA)    Sig: Take 3 tablets by mouth 2 (two) times daily. Each dose  contains 2 nirmatrelvir (pink tablets) and 1 ritonavir (white tablet). Take all 3 tablets together       Start: 1/1/24     Quantity: 30 tablet Refills: 0                           Ohs Peq Odvv Intake    1/1/2024  8:13 AM CST - Filed by Patient   Describe your reason for todays visit Covid   What is your current physical address in the event of a medical emergency? [address] [place]   Are you able to take your vital signs? Yes   Systolic Blood Pressure: 143   Diastolic Blood Pressure: 90   Weight: 185   Height: 67   Pulse: 94   Temperature: 98.6   Respiration rate:    Pulse Oxygen: 98   Please attach any relevant images or files          59 yo male with c/o uri symptoms for two days. He states he tested positive for covid yesterday. He states he is a . He states a lot of congestion and fatigue, chills and sore throat. He has pmh prostate ca.         Constitution: Positive for chills and fever.   HENT:  Positive for congestion, postnasal drip, sinus pressure and sore throat.    Cardiovascular: Negative.    Eyes: Negative.    Respiratory:  Positive for cough and sputum production. Negative for COPD and wheezing.    Gastrointestinal: Negative.  Negative for bowel incontinence.   Endocrine: negative.   Genitourinary: Negative.  Negative for dysuria, flank pain, bladder incontinence and pelvic pain.   Musculoskeletal:  Positive for joint pain and muscle ache. Negative for pain, abnormal ROM of joint and back pain.   Skin: Negative.    Allergic/Immunologic: Negative.    Neurological: Negative.    Hematologic/Lymphatic: Negative.    Psychiatric/Behavioral: Negative.          Objective:   The physical exam was conducted virtually.  Physical Exam   Constitutional: He is oriented to person, place, and time. He appears well-developed.   HENT:   Head: Normocephalic and atraumatic.   Ears:   Right Ear: Hearing, tympanic membrane and external ear normal.   Left Ear: Hearing, tympanic membrane and external ear normal.    Nose: Congestion present.   Mouth/Throat: Uvula is midline, oropharynx is clear and moist and mucous membranes are normal.   Eyes: Conjunctivae and EOM are normal. Pupils are equal, round, and reactive to light.   Neck: Neck supple.   Cardiovascular: Normal rate.   Pulmonary/Chest: Effort normal and breath sounds normal.   Musculoskeletal: Normal range of motion.         General: Normal range of motion.   Neurological: He is alert and oriented to person, place, and time.   Skin: Skin is warm.   Psychiatric: His behavior is normal. Thought content normal.   Nursing note and vitals reviewed.      Assessment:     1. COVID-19    2. Cough, unspecified type        Plan:     You have tested positive for COVID-19 today.      ISOLATION  If you tested positive and do not have symptoms, you must isolate for 5 days starting on the day of the positive test. I    If you tested positive and have symptoms, you must isolate for 5 days starting on the day of the first symptoms,  not the day of the positive test.     This is the most important part, both the CDC and the [facility] emphasize that you do not test out of isolation.     After 5 days, if your symptoms have improved and you have not had fever on day 5, you can return to the community on day 6- NO TESTING REQUIRED!      In fact, we do not retest if you were positive in the last 90 days.    After your 5 days of isolation are completed, the CDC recommends strict mask use for the first 5 days that you come out of isolation.  In order to return to activities of daily living per the CDC guidelines, you can be around other after: 5 days since symptoms first appeared, 24 hours with no fever without the use of fever-reducing medications, and other symptoms (besides loss of taste or smell) must be improving.  Once you meet all these requirements you may return to your normal activities including social distancing, wearing masks, and frequent handwashing - YOU DO NOT NEED ANOTHER TEST, OR  TO TEST NEGATIVE, IN ORDER TO END YOUR QUARANTINE!    Anyone who has been exposed to you since 2 days before your symptoms started should follow CDC guidelines for quarantine.  The CDC recommends quarantine if you have been in close contact (within 6 feet of someone for a cumulative total of 15 minutes or more over a 24-hour period) with someone who has COVID-19, unless you have been fully vaccinated. People who are fully vaccinated do NOT need to quarantine after contact with someone who had COVID-19 unless they have symptoms. However, fully vaccinated people should get tested 3-5 days after their exposure, even they dont have symptoms and wear a mask indoors in public for 14 days following exposure or until their test result is negative.  Individuals are recommended to stay home for 14 days after your last contact with a person who has COVID-19.  Watch for symptoms of COVID-19.  If possible, stay away from people you live with, especially people who are at higher risk for getting very sick from COVID-19.  If you'd like to end your quarantine early, your options are as follows.  You may end your quarantine on day 7 IF you have a negative covid test at least 5 days from last known exposure with no covid symptoms by day 7.  Or you may end quarantine on day with no test and no covid symptoms.    Important home care recommendations include: monitor your symptoms, if you have trouble breathing please go to the ER. Stay in a separate room from other household members, if possible.  Use a separate bathroom, if possible.  Avoid contact with other members of the household and pets.  Don't share personal household items, like cups, towels, and utensils.  Wear a mask when around other people if able.  Please refer to CDC's websit for more information about what to do if you you're sick and how to notify your contacts.    Stay home except to get medical care. Most people with COVID-19 have mild illness and can recover at home  without medical care. Do not leave your home, except to get medical care. Do not visit public areas. Get rest and stay hydrated. Call before you get medical care. Be sure to get care if you have trouble breathing, or have any other emergency warning signs, or if you think it is an emergency.  Avoid public transportation, ride-sharing, or taxis.  Separate yourself from other people.  As much as possible, stay in a specific room and away from other people and pets in your home. If possible, you should use a separate bathroom. If you need to be around other people or animals in or outside of the home, wear a mask.    Tell your close contacts that they may have been exposed to COVID-19. An infected person can spread COVID-19 starting 48 hours (or 2 days) before the person has any symptoms or tests positive. By letting your close contacts know they may have been exposed to COVID-19, you are helping to protect everyone.    Additional guidance is available for those living in close quarters and shared housing on CDC's web site.  Please see COVID-19 and Animals if you have questions about pets.  If you are diagnosed with COVID-19, someone from the health department may call you. Answer the call to slow the spread.  Look for emergency warning signs* for COVID-19. If someone is showing any of these signs, seek emergency medical care immediately:  Trouble breathing  Persistent pain or pressure in the chest  New confusion  Inability to wake or stay awake  Pale, gray, or blue-colored skin, lips, or nail beds, depending on skin tone  *This list is not all possible symptoms. Please call your medical provider for any other symptoms that are severe or concerning to you.    Call ahead before visiting your doctor  Call ahead. Many medical visits for routine care are being postponed or done by phone or telemedicine.  If you have a medical appointment that cannot be postponed, call your doctors office, and tell them you have or may  have COVID-19. This will help the office protect themselves and other patients.    You dont need to wear the mask if you are alone. If you cant put on a mask (because of trouble breathing, for example), cover your coughs and sneezes in some other way. Try to stay at least 6 feet away from other people. This will help protect the people around you.  Masks should not be placed on young children under age 2 years, anyone who has trouble breathing, or anyone who is not able to remove the mask without help.    Cover your coughs and sneezes  Cover your mouth and nose with a tissue when you cough or sneeze.  Throw away used tissues in a lined trash can.  Immediately wash your hands with soap and water for at least 20 seconds. If soap and water are not available, clean your hands with an alcohol-based hand  that contains at least 60% alcohol.  hands wash light icon  Clean your hands often  Wash your hands often with soap and water for at least 20 seconds. This is especially important after blowing your nose, coughing, or sneezing; going to the bathroom; and before eating or preparing food.  Use hand  if soap and water are not available. Use an alcohol-based hand  with at least 60% alcohol, covering all surfaces of your hands and rubbing them together until they feel dry.  Soap and water are the best option, especially if hands are visibly dirty.  Avoid touching your eyes, nose, and mouth with unwashed hands.  Handwashing Tips  Avoid sharing personal household items  Do not share dishes, drinking glasses, cups, eating utensils, towels, or bedding with other people in your home.  Wash these items thoroughly after using them with soap and water or put in the .  spraybottle icon  Clean all high-touch surfaces everyday  Clean and disinfect high-touch surfaces in your sick room and bathroom; wear disposable gloves. Let someone else clean and disinfect surfaces in common areas, but you  should clean your bedroom and bathroom, if possible.  If a caregiver or other person needs to clean and disinfect a sick persons bedroom or bathroom, they should do so on an as-needed basis. The caregiver/other person should wear a mask and disposable gloves prior to cleaning. They should wait as long as possible after the person who is sick has used the bathroom before coming in to clean and use the bathroom.  High-touch surfaces include phones, remote controls, counters, tabletops, doorknobs, bathroom fixtures, toilets, keyboards, tablets, and bedside tables.    Clean and disinfect areas that may have blood, stool, or body fluids on them.  Use household  and disinfectants. Clean the area or item with soap and water or another detergent if it is dirty. Then, use a household disinfectant.  Be sure to follow the instructions on the label to ensure safe and effective use of the product. Many products recommend keeping the surface wet for several minutes to ensure germs are killed. Many also recommend precautions such as wearing gloves and making sure you have good ventilation during use of the product.  Use a product from EPAs List N: Disinfectants for Coronavirus (COVID-19)external icon.  Complete Disinfection Guidance      PLEASE READ YOUR DISCHARGE INSTRUCTIONS ENTIRELY AS IT CONTAINS IMPORTANT INFORMATION.    Please drink plenty of fluids.    Please get plenty of rest.    If not allergic, please take over the counter Tylenol (Acetaminophen) and/or Motrin (Ibuprofen) as directed for control of muscle aches, pain, and/or fever.    Please go to the Emergency Department for any shortness of breath or difficulty breathing.    For congestion, runny nose, or post nasal drip please take an over the counter antihistamine medication (Claritin/Zyrtec/Allegra) of your choice as directed or try an over the counter decongestant like Sudafed. You buy this behind the pharmacy counter.  You can also buy combination OTC  antihistamine plus decongestant medications such at Zyrtec-D or Claritin-D.  Do not take decongestant if you suffer from high blood pressure.    For cough, you may be prescribed medication.  Take as prescribed.  If you were not prescribed any medication, you can use over the counter cough medications such as Robitussin.  If you have chest congestion you can consider using over the counter Mucinex but make sure you drink plenty of water to encourage mobilization of the secretions.    If you do have high blood pressure or palpitations, it is safe to take Coricidin HBP for relief of sinus symptoms.    Sore throat recommendations: Warm fluids (tea with honey, soup, broth), warm salt water gargles, throat lozenges, rest, hydration.    If you  smoke, please stop smoking.    You must understand that you've received an Urgent Care treatment only and that you may be released before all of your medical problems are known or treated. You, the patient, will arrange for follow up as instructed. If your symptoms worsen or fail to improve you should go to the Emergency Room.    COVID-19  -     nirmatrelvir-ritonavir (PAXLOVID) 300 mg (150 mg x 2)-100 mg copackaged tablets (EUA); Take 3 tablets by mouth 2 (two) times daily. Each dose contains 2 nirmatrelvir (pink tablets) and 1 ritonavir (white tablet). Take all 3 tablets together  Dispense: 30 tablet; Refill: 0    Cough, unspecified type  -     benzonatate (TESSALON) 100 MG capsule; Take 1 capsule (100 mg total) by mouth 3 (three) times daily as needed for Cough.  Dispense: 30 capsule; Refill: 0             Additional MDM:     Heart Failure Score:   COPD = No

## 2024-01-01 NOTE — LETTER
January 1, 2024    Jacoby Blue  [address] [facility] [place] [address]             Virtual Visit - Urgent Care  Urgent Care  [address] [place] [address] [address] [address]   January 1, 2024     Patient: Jacoby Blue   YOB: 1965   Date of Visit: 1/1/2024       To Whom it May Concern:    Jacoby Blue was seen virtually on 1/1/2024. He may return to work on 1/9/2023 .    Please excuse him from any classes or work missed.    If you have any questions or concerns, please don't hesitate to call.    Sincerely,          Beatriz Velasquez, CYRUSP      No

## 2024-04-29 ENCOUNTER — LAB VISIT (OUTPATIENT)
Dept: LAB | Facility: HOSPITAL | Age: 59
End: 2024-04-29
Attending: NURSE PRACTITIONER
Payer: COMMERCIAL

## 2024-04-29 DIAGNOSIS — C61 MALIGNANT NEOPLASM OF PROSTATE: ICD-10-CM

## 2024-04-29 DIAGNOSIS — Z92.3 PERSONAL HISTORY OF IRRADIATION, PRESENTING HAZARDS TO HEALTH: ICD-10-CM

## 2024-04-29 DIAGNOSIS — E66.3 SEVERELY OVERWEIGHT: ICD-10-CM

## 2024-04-29 DIAGNOSIS — K63.3 ULCERATION OF INTESTINE: ICD-10-CM

## 2024-04-29 DIAGNOSIS — D64.9 ANEMIA, UNSPECIFIED TYPE: Primary | ICD-10-CM

## 2024-04-29 DIAGNOSIS — Z79.1 NSAID LONG-TERM USE: ICD-10-CM

## 2024-04-29 DIAGNOSIS — R19.4 FREQUENT BOWEL MOVEMENTS: ICD-10-CM

## 2024-04-29 LAB
BASOPHILS # BLD AUTO: 0.02 X10(3)/MCL
BASOPHILS NFR BLD AUTO: 0.5 %
EOSINOPHIL # BLD AUTO: 0.11 X10(3)/MCL (ref 0–0.9)
EOSINOPHIL NFR BLD AUTO: 2.6 %
ERYTHROCYTE [DISTWIDTH] IN BLOOD BY AUTOMATED COUNT: 13.9 % (ref 11.5–17)
HCT VFR BLD AUTO: 36.8 % (ref 42–52)
HGB BLD-MCNC: 12 G/DL (ref 14–18)
IMM GRANULOCYTES # BLD AUTO: 0.01 X10(3)/MCL (ref 0–0.04)
IMM GRANULOCYTES NFR BLD AUTO: 0.2 %
IRON SATN MFR SERPL: 22 % (ref 20–50)
IRON SERPL-MCNC: 56 UG/DL (ref 65–175)
LYMPHOCYTES # BLD AUTO: 0.7 X10(3)/MCL (ref 0.6–4.6)
LYMPHOCYTES NFR BLD AUTO: 16.4 %
MCH RBC QN AUTO: 30.9 PG (ref 27–31)
MCHC RBC AUTO-ENTMCNC: 32.6 G/DL (ref 33–36)
MCV RBC AUTO: 94.8 FL (ref 80–94)
MONOCYTES # BLD AUTO: 0.4 X10(3)/MCL (ref 0.1–1.3)
MONOCYTES NFR BLD AUTO: 9.3 %
NEUTROPHILS # BLD AUTO: 3.04 X10(3)/MCL (ref 2.1–9.2)
NEUTROPHILS NFR BLD AUTO: 71 %
NRBC BLD AUTO-RTO: 0 %
PLATELET # BLD AUTO: 296 X10(3)/MCL (ref 130–400)
PMV BLD AUTO: 10.3 FL (ref 7.4–10.4)
RBC # BLD AUTO: 3.88 X10(6)/MCL (ref 4.7–6.1)
RET# (OHS): 0.07 X10E6/UL (ref 0.03–0.1)
RETICULOCYTE COUNT AUTOMATED (OLG): 1.73 % (ref 1.1–2.1)
TIBC SERPL-MCNC: 194 UG/DL (ref 69–240)
TIBC SERPL-MCNC: 250 UG/DL (ref 250–450)
TRANSFERRIN SERPL-MCNC: 230 MG/DL (ref 174–364)
WBC # SPEC AUTO: 4.28 X10(3)/MCL (ref 4.5–11.5)

## 2024-04-29 PROCEDURE — 85025 COMPLETE CBC W/AUTO DIFF WBC: CPT

## 2024-04-29 PROCEDURE — 83540 ASSAY OF IRON: CPT

## 2024-04-29 PROCEDURE — 36415 COLL VENOUS BLD VENIPUNCTURE: CPT

## 2024-04-29 PROCEDURE — 85045 AUTOMATED RETICULOCYTE COUNT: CPT

## 2024-05-02 DIAGNOSIS — Z00.00 GENERAL MEDICAL EXAM: Primary | ICD-10-CM

## 2024-08-05 ENCOUNTER — OFFICE VISIT (OUTPATIENT)
Dept: INTERNAL MEDICINE | Facility: CLINIC | Age: 59
End: 2024-08-05
Payer: COMMERCIAL

## 2024-08-05 VITALS
BODY MASS INDEX: 29.98 KG/M2 | SYSTOLIC BLOOD PRESSURE: 120 MMHG | HEIGHT: 67 IN | DIASTOLIC BLOOD PRESSURE: 79 MMHG | HEART RATE: 63 BPM | WEIGHT: 191 LBS

## 2024-08-05 DIAGNOSIS — Z00.00 GENERAL MEDICAL EXAM: ICD-10-CM

## 2024-08-05 LAB
ALBUMIN SERPL-MCNC: 4.2 G/DL (ref 3.4–4.8)
ALBUMIN/GLOB SERPL: 1.5 RATIO (ref 1.1–2)
ALP SERPL-CCNC: 69 UNIT/L (ref 40–150)
ALT SERPL-CCNC: 14 UNIT/L (ref 0–55)
ANION GAP SERPL CALC-SCNC: 6 MEQ/L
AST SERPL-CCNC: 21 UNIT/L (ref 5–34)
BASOPHILS # BLD AUTO: 0.04 X10(3)/MCL
BASOPHILS NFR BLD AUTO: 0.8 %
BILIRUB SERPL-MCNC: 0.4 MG/DL
BUN SERPL-MCNC: 17.6 MG/DL (ref 8.4–25.7)
CALCIUM SERPL-MCNC: 9.3 MG/DL (ref 8.8–10)
CHLORIDE SERPL-SCNC: 106 MMOL/L (ref 98–107)
CO2 SERPL-SCNC: 25 MMOL/L (ref 23–31)
CREAT SERPL-MCNC: 1.14 MG/DL (ref 0.73–1.18)
CREAT/UREA NIT SERPL: 15
EOSINOPHIL # BLD AUTO: 0.1 X10(3)/MCL (ref 0–0.9)
EOSINOPHIL NFR BLD AUTO: 2.1 %
ERYTHROCYTE [DISTWIDTH] IN BLOOD BY AUTOMATED COUNT: 13.8 % (ref 11.5–17)
EST. AVERAGE GLUCOSE BLD GHB EST-MCNC: 108.3 MG/DL
GFR SERPLBLD CREATININE-BSD FMLA CKD-EPI: >60 ML/MIN/1.73/M2
GLOBULIN SER-MCNC: 2.8 GM/DL (ref 2.4–3.5)
GLUCOSE SERPL-MCNC: 96 MG/DL (ref 82–115)
GLUCOSE SERPL-MCNC: NORMAL MG/DL (ref 70–110)
HBA1C MFR BLD: 5.4 %
HCT VFR BLD AUTO: 35 % (ref 42–52)
HGB BLD-MCNC: 11.9 G/DL (ref 14–18)
IMM GRANULOCYTES # BLD AUTO: 0.03 X10(3)/MCL (ref 0–0.04)
IMM GRANULOCYTES NFR BLD AUTO: 0.6 %
LYMPHOCYTES # BLD AUTO: 0.82 X10(3)/MCL (ref 0.6–4.6)
LYMPHOCYTES NFR BLD AUTO: 17.2 %
MCH RBC QN AUTO: 31.3 PG (ref 27–31)
MCHC RBC AUTO-ENTMCNC: 34 G/DL (ref 33–36)
MCV RBC AUTO: 92.1 FL (ref 80–94)
MONOCYTES # BLD AUTO: 0.45 X10(3)/MCL (ref 0.1–1.3)
MONOCYTES NFR BLD AUTO: 9.4 %
NEUTROPHILS # BLD AUTO: 3.34 X10(3)/MCL (ref 2.1–9.2)
NEUTROPHILS NFR BLD AUTO: 69.9 %
NRBC BLD AUTO-RTO: 0 %
PLATELET # BLD AUTO: 294 X10(3)/MCL (ref 130–400)
PMV BLD AUTO: 10.9 FL (ref 7.4–10.4)
POC CHOLESTEROL, HDL: 40
POC CHOLESTEROL, LDL: 61
POC CHOLESTEROL, TOTAL: 117 MG/DL
POC GLUCOSE FASTING: 98
POC TOTAL CHOLESTEROL / HDL RATIO: 2.9
POC TRIGLYCERIDES: 78
POTASSIUM SERPL-SCNC: 4.3 MMOL/L (ref 3.5–5.1)
PROT SERPL-MCNC: 7 GM/DL (ref 5.8–7.6)
PSA SERPL-MCNC: 0.52 NG/ML
RBC # BLD AUTO: 3.8 X10(6)/MCL (ref 4.7–6.1)
SODIUM SERPL-SCNC: 137 MMOL/L (ref 136–145)
WBC # BLD AUTO: 4.78 X10(3)/MCL (ref 4.5–11.5)

## 2024-08-05 PROCEDURE — 80053 COMPREHEN METABOLIC PANEL: CPT | Performed by: FAMILY MEDICINE

## 2024-08-05 PROCEDURE — 99499 UNLISTED E&M SERVICE: CPT | Mod: ,,, | Performed by: FAMILY MEDICINE

## 2024-08-05 PROCEDURE — 82947 ASSAY GLUCOSE BLOOD QUANT: CPT | Mod: QW,,, | Performed by: FAMILY MEDICINE

## 2024-08-05 PROCEDURE — 36415 COLL VENOUS BLD VENIPUNCTURE: CPT | Mod: ,,, | Performed by: FAMILY MEDICINE

## 2024-08-05 PROCEDURE — 80061 LIPID PANEL: CPT | Mod: QW,,, | Performed by: FAMILY MEDICINE

## 2024-08-05 PROCEDURE — 0358T BIA WHOLE BODY: CPT | Mod: ,,, | Performed by: FAMILY MEDICINE

## 2024-08-05 PROCEDURE — 99172 OCULAR FUNCTION SCREEN: CPT | Mod: ,,, | Performed by: FAMILY MEDICINE

## 2024-08-05 PROCEDURE — 84153 ASSAY OF PSA TOTAL: CPT | Performed by: FAMILY MEDICINE

## 2024-08-05 PROCEDURE — 83036 HEMOGLOBIN GLYCOSYLATED A1C: CPT | Performed by: FAMILY MEDICINE

## 2024-08-05 PROCEDURE — 85025 COMPLETE CBC W/AUTO DIFF WBC: CPT | Performed by: FAMILY MEDICINE

## 2024-10-29 ENCOUNTER — LAB VISIT (OUTPATIENT)
Dept: LAB | Facility: HOSPITAL | Age: 59
End: 2024-10-29
Attending: NURSE PRACTITIONER
Payer: COMMERCIAL

## 2024-10-29 DIAGNOSIS — K63.3 ULCERATION OF INTESTINE: ICD-10-CM

## 2024-10-29 DIAGNOSIS — Z92.3 PERSONAL HISTORY OF IRRADIATION, PRESENTING HAZARDS TO HEALTH: ICD-10-CM

## 2024-10-29 DIAGNOSIS — E66.3 OVER WEIGHT: ICD-10-CM

## 2024-10-29 DIAGNOSIS — C61 PROSTATE CANCER: ICD-10-CM

## 2024-10-29 DIAGNOSIS — Z79.1 ENCOUNTER FOR LONG-TERM USE OF NON-STEROIDAL ANTI-INFLAMMATORY MEDICATION: ICD-10-CM

## 2024-10-29 DIAGNOSIS — D64.9 ANEMIA, UNSPECIFIED TYPE: Primary | ICD-10-CM

## 2024-10-29 LAB
BASOPHILS # BLD AUTO: 0.01 X10(3)/MCL
BASOPHILS NFR BLD AUTO: 0.2 %
EOSINOPHIL # BLD AUTO: 0.09 X10(3)/MCL (ref 0–0.9)
EOSINOPHIL NFR BLD AUTO: 2.2 %
ERYTHROCYTE [DISTWIDTH] IN BLOOD BY AUTOMATED COUNT: 14.1 % (ref 11.5–17)
FOLATE SERPL-MCNC: 13.5 NG/ML (ref 7–31.4)
HCT VFR BLD AUTO: 37.3 % (ref 42–52)
HGB BLD-MCNC: 12 G/DL (ref 14–18)
IMM GRANULOCYTES # BLD AUTO: 0.01 X10(3)/MCL (ref 0–0.04)
IMM GRANULOCYTES NFR BLD AUTO: 0.2 %
IRON SATN MFR SERPL: 21 % (ref 20–50)
IRON SERPL-MCNC: 53 UG/DL (ref 65–175)
LYMPHOCYTES # BLD AUTO: 0.67 X10(3)/MCL (ref 0.6–4.6)
LYMPHOCYTES NFR BLD AUTO: 16.1 %
MCH RBC QN AUTO: 30 PG (ref 27–31)
MCHC RBC AUTO-ENTMCNC: 32.2 G/DL (ref 33–36)
MCV RBC AUTO: 93.3 FL (ref 80–94)
MONOCYTES # BLD AUTO: 0.39 X10(3)/MCL (ref 0.1–1.3)
MONOCYTES NFR BLD AUTO: 9.4 %
NEUTROPHILS # BLD AUTO: 2.98 X10(3)/MCL (ref 2.1–9.2)
NEUTROPHILS NFR BLD AUTO: 71.9 %
NRBC BLD AUTO-RTO: 0 %
PLATELET # BLD AUTO: 280 X10(3)/MCL (ref 130–400)
PMV BLD AUTO: 10.5 FL (ref 7.4–10.4)
RBC # BLD AUTO: 4 X10(6)/MCL (ref 4.7–6.1)
TIBC SERPL-MCNC: 198 UG/DL (ref 69–240)
TIBC SERPL-MCNC: 251 UG/DL (ref 250–450)
TRANSFERRIN SERPL-MCNC: 223 MG/DL (ref 174–364)
VIT B12 SERPL-MCNC: 533 PG/ML (ref 213–816)
WBC # BLD AUTO: 4.15 X10(3)/MCL (ref 4.5–11.5)

## 2024-10-29 PROCEDURE — 82607 VITAMIN B-12: CPT

## 2024-10-29 PROCEDURE — 36415 COLL VENOUS BLD VENIPUNCTURE: CPT

## 2024-10-29 PROCEDURE — 83550 IRON BINDING TEST: CPT

## 2024-10-29 PROCEDURE — 85025 COMPLETE CBC W/AUTO DIFF WBC: CPT

## 2024-10-29 PROCEDURE — 82746 ASSAY OF FOLIC ACID SERUM: CPT

## 2024-11-21 ENCOUNTER — HOSPITAL ENCOUNTER (OUTPATIENT)
Dept: RADIOLOGY | Facility: HOSPITAL | Age: 59
Discharge: HOME | End: 2024-11-21
Attending: INTERNAL MEDICINE
Payer: COMMERCIAL

## 2024-11-21 DIAGNOSIS — T18.4XXA FOREIGN BODY IN INTESTINE AND COLON: ICD-10-CM

## 2024-11-21 DIAGNOSIS — H57.811: ICD-10-CM

## 2024-11-21 DIAGNOSIS — T18.3XXA FOREIGN BODY IN INTESTINE AND COLON: ICD-10-CM

## 2024-11-21 DIAGNOSIS — K92.1 HEMATOCHEZIA: ICD-10-CM

## 2024-11-21 PROCEDURE — 74018 RADEX ABDOMEN 1 VIEW: CPT | Mod: TC

## 2025-02-25 ENCOUNTER — LAB VISIT (OUTPATIENT)
Dept: LAB | Facility: HOSPITAL | Age: 60
End: 2025-02-25
Attending: NURSE PRACTITIONER
Payer: COMMERCIAL

## 2025-02-25 DIAGNOSIS — Z79.1 ENCOUNTER FOR LONG-TERM (CURRENT) USE OF NON-STEROIDAL ANTI-INFLAMMATORIES: ICD-10-CM

## 2025-02-25 DIAGNOSIS — E66.9 OBESITY, UNSPECIFIED CLASS, UNSPECIFIED OBESITY TYPE, UNSPECIFIED WHETHER SERIOUS COMORBIDITY PRESENT: ICD-10-CM

## 2025-02-25 DIAGNOSIS — M35.9 DIFFUSE DISEASE OF CONNECTIVE TISSUE: ICD-10-CM

## 2025-02-25 DIAGNOSIS — Z92.3 PERSONAL HISTORY OF IRRADIATION, PRESENTING HAZARDS TO HEALTH: ICD-10-CM

## 2025-02-25 DIAGNOSIS — C61 MALIGNANT NEOPLASM OF PROSTATE: ICD-10-CM

## 2025-02-25 DIAGNOSIS — Z00.00 GENERAL MEDICAL EXAM: Primary | ICD-10-CM

## 2025-02-25 DIAGNOSIS — K63.3 ULCERATION OF INTESTINE: ICD-10-CM

## 2025-02-25 DIAGNOSIS — D64.9 ANEMIA, UNSPECIFIED TYPE: Primary | ICD-10-CM

## 2025-02-25 LAB
ALBUMIN SERPL-MCNC: 4.1 G/DL (ref 3.4–4.8)
ALBUMIN/GLOB SERPL: 1.5 RATIO (ref 1.1–2)
ALP SERPL-CCNC: 68 UNIT/L (ref 40–150)
ALT SERPL-CCNC: 13 UNIT/L (ref 0–55)
ANION GAP SERPL CALC-SCNC: 8 MEQ/L
AST SERPL-CCNC: 15 UNIT/L (ref 5–34)
BASOPHILS # BLD AUTO: 0.02 X10(3)/MCL
BASOPHILS NFR BLD AUTO: 0.4 %
BILIRUB SERPL-MCNC: 0.5 MG/DL
BUN SERPL-MCNC: 18.8 MG/DL (ref 8.4–25.7)
CALCIUM SERPL-MCNC: 8.8 MG/DL (ref 8.8–10)
CHLORIDE SERPL-SCNC: 103 MMOL/L (ref 98–107)
CO2 SERPL-SCNC: 27 MMOL/L (ref 23–31)
CREAT SERPL-MCNC: 1.26 MG/DL (ref 0.72–1.25)
CREAT/UREA NIT SERPL: 15
EOSINOPHIL # BLD AUTO: 0.07 X10(3)/MCL (ref 0–0.9)
EOSINOPHIL NFR BLD AUTO: 1.4 %
ERYTHROCYTE [DISTWIDTH] IN BLOOD BY AUTOMATED COUNT: 13.9 % (ref 11.5–17)
GFR SERPLBLD CREATININE-BSD FMLA CKD-EPI: >60 ML/MIN/1.73/M2
GLOBULIN SER-MCNC: 2.7 GM/DL (ref 2.4–3.5)
GLUCOSE SERPL-MCNC: 91 MG/DL (ref 82–115)
HCT VFR BLD AUTO: 36.6 % (ref 42–52)
HGB BLD-MCNC: 11.9 G/DL (ref 14–18)
IMM GRANULOCYTES # BLD AUTO: 0.03 X10(3)/MCL (ref 0–0.04)
IMM GRANULOCYTES NFR BLD AUTO: 0.6 %
IRON SATN MFR SERPL: 25 % (ref 20–50)
IRON SERPL-MCNC: 62 UG/DL (ref 65–175)
LYMPHOCYTES # BLD AUTO: 0.76 X10(3)/MCL (ref 0.6–4.6)
LYMPHOCYTES NFR BLD AUTO: 15.5 %
MCH RBC QN AUTO: 29.8 PG (ref 27–31)
MCHC RBC AUTO-ENTMCNC: 32.5 G/DL (ref 33–36)
MCV RBC AUTO: 91.7 FL (ref 80–94)
MONOCYTES # BLD AUTO: 0.45 X10(3)/MCL (ref 0.1–1.3)
MONOCYTES NFR BLD AUTO: 9.2 %
NEUTROPHILS # BLD AUTO: 3.58 X10(3)/MCL (ref 2.1–9.2)
NEUTROPHILS NFR BLD AUTO: 72.9 %
NRBC BLD AUTO-RTO: 0 %
PLATELET # BLD AUTO: 276 X10(3)/MCL (ref 130–400)
PMV BLD AUTO: 10.5 FL (ref 7.4–10.4)
POTASSIUM SERPL-SCNC: 3.9 MMOL/L (ref 3.5–5.1)
PROT SERPL-MCNC: 6.8 GM/DL (ref 5.8–7.6)
RBC # BLD AUTO: 3.99 X10(6)/MCL (ref 4.7–6.1)
SODIUM SERPL-SCNC: 138 MMOL/L (ref 136–145)
TIBC SERPL-MCNC: 186 UG/DL (ref 60–240)
TIBC SERPL-MCNC: 248 UG/DL (ref 250–450)
TRANSFERRIN SERPL-MCNC: 214 MG/DL (ref 174–364)
WBC # BLD AUTO: 4.91 X10(3)/MCL (ref 4.5–11.5)

## 2025-02-25 PROCEDURE — 85025 COMPLETE CBC W/AUTO DIFF WBC: CPT

## 2025-02-25 PROCEDURE — 80053 COMPREHEN METABOLIC PANEL: CPT

## 2025-02-25 PROCEDURE — 83540 ASSAY OF IRON: CPT

## 2025-02-25 PROCEDURE — 36415 COLL VENOUS BLD VENIPUNCTURE: CPT

## 2025-03-05 DIAGNOSIS — D64.9 ANEMIA, UNSPECIFIED TYPE: Primary | ICD-10-CM

## 2025-07-09 ENCOUNTER — OFFICE VISIT (OUTPATIENT)
Dept: INTERNAL MEDICINE | Facility: CLINIC | Age: 60
End: 2025-07-09
Payer: COMMERCIAL

## 2025-07-09 VITALS
HEART RATE: 64 BPM | HEIGHT: 68 IN | BODY MASS INDEX: 28.79 KG/M2 | WEIGHT: 190 LBS | SYSTOLIC BLOOD PRESSURE: 123 MMHG | DIASTOLIC BLOOD PRESSURE: 80 MMHG

## 2025-07-09 DIAGNOSIS — Z00.00 GENERAL MEDICAL EXAM: Primary | ICD-10-CM

## 2025-07-09 DIAGNOSIS — Z00.00 GENERAL MEDICAL EXAM: ICD-10-CM

## 2025-07-09 LAB
ALBUMIN SERPL-MCNC: 4.2 G/DL (ref 3.4–4.8)
ALBUMIN/GLOB SERPL: 1.4 RATIO (ref 1.1–2)
ALP SERPL-CCNC: 66 UNIT/L (ref 40–150)
ALT SERPL-CCNC: 15 UNIT/L (ref 0–55)
ANION GAP SERPL CALC-SCNC: 9 MEQ/L
AST SERPL-CCNC: 20 UNIT/L (ref 11–45)
BASOPHILS # BLD AUTO: 0.03 X10(3)/MCL
BASOPHILS NFR BLD AUTO: 0.5 %
BILIRUB SERPL-MCNC: 0.5 MG/DL
BUN SERPL-MCNC: 18.8 MG/DL (ref 8.4–25.7)
CALCIUM SERPL-MCNC: 8.8 MG/DL (ref 8.8–10)
CHLORIDE SERPL-SCNC: 102 MMOL/L (ref 98–107)
CO2 SERPL-SCNC: 26 MMOL/L (ref 23–31)
CREAT SERPL-MCNC: 1.24 MG/DL (ref 0.72–1.25)
CREAT/UREA NIT SERPL: 15
EOSINOPHIL # BLD AUTO: 0.1 X10(3)/MCL (ref 0–0.9)
EOSINOPHIL NFR BLD AUTO: 1.8 %
ERYTHROCYTE [DISTWIDTH] IN BLOOD BY AUTOMATED COUNT: 13.5 % (ref 11.5–17)
EST. AVERAGE GLUCOSE BLD GHB EST-MCNC: 102.5 MG/DL
GFR SERPLBLD CREATININE-BSD FMLA CKD-EPI: >60 ML/MIN/1.73/M2
GLOBULIN SER-MCNC: 3.1 GM/DL (ref 2.4–3.5)
GLUCOSE SERPL-MCNC: 96 MG/DL (ref 82–115)
GLUCOSE SERPL-MCNC: NORMAL MG/DL (ref 70–110)
HBA1C MFR BLD: 5.2 %
HCT VFR BLD AUTO: 37.6 % (ref 42–52)
HGB BLD-MCNC: 12.4 G/DL (ref 14–18)
IMM GRANULOCYTES # BLD AUTO: 0.02 X10(3)/MCL (ref 0–0.04)
IMM GRANULOCYTES NFR BLD AUTO: 0.4 %
LYMPHOCYTES # BLD AUTO: 0.86 X10(3)/MCL (ref 0.6–4.6)
LYMPHOCYTES NFR BLD AUTO: 15.1 %
MCH RBC QN AUTO: 30.9 PG (ref 27–31)
MCHC RBC AUTO-ENTMCNC: 33 G/DL (ref 33–36)
MCV RBC AUTO: 93.8 FL (ref 80–94)
MONOCYTES # BLD AUTO: 0.45 X10(3)/MCL (ref 0.1–1.3)
MONOCYTES NFR BLD AUTO: 7.9 %
NEUTROPHILS # BLD AUTO: 4.22 X10(3)/MCL (ref 2.1–9.2)
NEUTROPHILS NFR BLD AUTO: 74.3 %
NRBC BLD AUTO-RTO: 0 %
PLATELET # BLD AUTO: 287 X10(3)/MCL (ref 130–400)
PMV BLD AUTO: 10.8 FL (ref 7.4–10.4)
POC CHOLESTEROL, HDL: 38
POC CHOLESTEROL, LDL: 74
POC CHOLESTEROL, TOTAL: 133 MG/DL
POC GLUCOSE FASTING: 100
POC TOTAL CHOLESTEROL / HDL RATIO: 3.5
POC TRIGLYCERIDES: 113
POTASSIUM SERPL-SCNC: 4 MMOL/L (ref 3.5–5.1)
PROT SERPL-MCNC: 7.3 GM/DL (ref 5.8–7.6)
PSA SERPL-MCNC: 0.81 NG/ML
RBC # BLD AUTO: 4.01 X10(6)/MCL (ref 4.7–6.1)
SODIUM SERPL-SCNC: 137 MMOL/L (ref 136–145)
WBC # BLD AUTO: 5.68 X10(3)/MCL (ref 4.5–11.5)

## 2025-07-09 PROCEDURE — 84153 ASSAY OF PSA TOTAL: CPT | Performed by: FAMILY MEDICINE

## 2025-07-09 PROCEDURE — 85025 COMPLETE CBC W/AUTO DIFF WBC: CPT | Performed by: FAMILY MEDICINE

## 2025-07-09 PROCEDURE — 83036 HEMOGLOBIN GLYCOSYLATED A1C: CPT | Performed by: FAMILY MEDICINE

## 2025-07-09 PROCEDURE — 80053 COMPREHEN METABOLIC PANEL: CPT | Performed by: FAMILY MEDICINE

## 2025-07-09 NOTE — PROGRESS NOTES
"    [facility]  Debbie Mendoza MD      [facility] HEALTH & WELLNESS SCREENING     A one-time consultation was provided to you today. The following information was reviewed in detail:  -Health Risk Assessment (HRA)  -Vital Signs  -Lipid Panel  -InBody Assessment  -Vision Screening  -Diet, exercise, and general health recommendations      Jacoby Blue is a 60 y.o. male who is here today for a health screen.     CURRENT MEDICAL HISTORY   Mr. Dozier reports no recent changes to his medical history.  He recently had an MRI of his left chest wall after feeling a "pop" with pain while lifting weights. He has a follow up with an ortho soon. He states the MRI report did not show a tear.   He takes 3 medications for HTN which is controlled today.   Mr. Blue has a history of prostate cancer and is closely followed by his urologist.   His last colonoscopy was in 2023 and is due for the next one in 2033.     IN OFFICE TESTING     /80   Pulse 64   Ht 5' 8.4" (1.737 m)   Wt 86.2 kg (190 lb)   BMI 28.55 kg/m²     274}  Lab Results   Component Value Date    CHOL 133 07/09/2025    POCLDL 74 07/09/2025    POCHDL 38 07/09/2025    POCTCHDL 3.5 07/09/2025    POCTRIG 113 07/09/2025    GLUFAST 100 07/09/2025    POCGLU 89 09/30/2022       Vision Screening    Right eye Left eye Both eyes   Without correction      With correction   20/20   Comments: Far Acuity 20/20  Near Acuity 20/30      In Body Assessment   Impression  Abnormal   Plan Exercise Prescription         Type: Aerobic: walking, stationary cycling, aquatic exercise, running         Frequency: 3-5 sessions/week         Intensity: Moderate (RPE: 10-13)          Time: 30 minutes (150 minutes/week)              Plus         Type: Strength/Resistance Training: exercises using resistance bands, weight machines, handheld  weights or body weight         Frequency: 2-3 session/week               Plus         Type: Flexibility training: pre/post workout " stretching, yoga, paola chi         Frequency: 5-7 sessions/week         Time: 5-10 minutes       Plan Diet Recommendations:  Follow a heart healthy diet such as the Mediterranean-style diet.   Eat an overall healthy dietary pattern that emphasizes:   -a wide variety of fruits and vegetables   -whole grains and products made up mostly of whole grains   -healthy sources of protein (mostly plants such as legumes and nuts; fish and seafood; low fat or nonfat dairy; and, if you eat meat and poultry, ensuring it is lean and unprocessed)   -liquid non-tropical vegetable oils   -minimally processed foods   -minimized intake of added sugars   -food prepared with little or no salt   -limited or preferably no alcohol intake     FINAL RECOMMENDATIONS     Based on the patient's HRA, Lipid Panel, Vitals, Vision Screening and InBody Assessment, the following recommendations were made:     Health Interventions Work towards a 15lb fat mass loss over the next 12-18 months.        Health Maintenance Cancer Screening: The following cancer screening exams are recommended for you: colorectal cancer screening and prostate cancer screening  All recommended cancer screening exams are up to date.  Continue yearly wellness visits as planned.     Immunizations: The following immunizations are recommended for your age group:  Tetanus vaccine, Influenza vaccine, Hepatitis B vaccine, Covid vaccine, Shingles vaccine, Pneumonia vaccine, and RSV vaccine  Talk to your doctor about any vaccines you are missing.     Heart Health: Follow a heart healthy diet (see details above), maintain a healthy blood pressure (<140/80) and weight (BMI 25-29.9). Follow a regular aerobic exercise routine (see exercise prescription above).     Results for the HbA1c, PSA, CBC and Complete Metabolic Panel tests that were drawn during the health screening will be routed for physician review. Outreach will be performed through the [facility] patient portal or by telephone  following the release of this clinic note.     Early identification and prevention are the keys to maintaining overall health and prevention of chronic diseases. For this reason, I recommend yearly physical examinations through this program and/or with your primary care physician.       It was a pleasure taking care of you, Mr. Blue. Thank you for using the [facility].            [facility] [address]   [place], [place]  [address]